# Patient Record
Sex: MALE | Race: WHITE | Employment: PART TIME | URBAN - METROPOLITAN AREA
[De-identification: names, ages, dates, MRNs, and addresses within clinical notes are randomized per-mention and may not be internally consistent; named-entity substitution may affect disease eponyms.]

---
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
## 2018-01-15 VITALS
DIASTOLIC BLOOD PRESSURE: 80 MMHG | WEIGHT: 299 LBS | HEART RATE: 74 BPM | HEIGHT: 71 IN | BODY MASS INDEX: 41.86 KG/M2 | SYSTOLIC BLOOD PRESSURE: 120 MMHG | RESPIRATION RATE: 20 BRPM

## 2018-01-15 NOTE — PROGRESS NOTES
Assessment   1  Never a smoker  2  Encounter for preventive health examination (V70 0) (Z00 00)  3  BMI 40 0-44 9, adult (V85 41) (Z68 41)    Plan   BMI 40 0-44 9, adult    · (1) CBC/PLT/DIFF; Status:Active; Requested DWW:[id number];    · (1) COMPREHENSIVE METABOLIC PANEL; Status:Active; Requested TZY:[id number];    · (1) HEMOGLOBIN A1C; Status:Active; Requested PDD:[id number];    · (1) INSULIN, FASTING; Status:Active; Requested ZSK:[id number];    · (1) LIPID PANEL, FASTING; Status:Active; Requested ED:[id number];    · (1) TSH; Status:Active; Requested QVN:00HMA5986;    · (1) URINALYSIS (will reflex a microscopy if leukocytes, occult blood, protein or nitrites  are not within normal limits); Status:Active; Requested NPE:[id number];    · (1) VITAMIN D 25-HYDROXY; Status:Active; Requested NGT:78QVK6985; Health Maintenance    · Always use a seat belt and shoulder strap when riding or driving a motor vehicle ;  Status:Complete;   Done: 68CHZ7033   · Decreasing the stress in your life may help your condition improve ; Status:Complete;    Done: 75TQG4675   · Eat a normal well-balanced diet ; Status:Complete;   Done: 97GAX1814   · Stretch and warm up your muscles during the first 10 minutes , then cool down your  muscles for the last 10 minutes of exercise ; Status:Complete;   Done: 27RLY7660   · There are many ways to reduce your risk of catching or spreading a sexually transmitted  Infection ; Status:Complete;   Done: 63GAT6336   · Use a sun block product with an SPF of 15 or more ; Status:Complete;   Done:  97BVY7650   · Using a latex condom can help prevent pregnancy   It can also help to prevent the spread  of sexually transmitted infections ; Status:Complete;   Done: 37UJU2201  PMH: Foreign body sensation in right ear canal    · Administer: Ciprodex 0 3-0 1 % Otic Suspension; INSTILL 4 DROPS IN THE AFFECTED  EAR(S) TWICE DAILY    Follow-up visit in 1 year Evaluation and Treatment  Follow-up  Status: Hold For - Scheduling  Requested for: 37WMG7555  Ordered; For: Health Maintenance;  Ordered By: Svitlana Cast  Performed:   Due: 50RMZ2916     Discussion/Summary  Impression: health maintenance visit  Currently, he eats a poor diet and has an inadequate exercise regimen  Prostate cancer screening: PSA is not indicated  Testicular cancer screening: testicular cancer screening is not indicated  Colorectal cancer screening: colorectal cancer screening is not indicated  Screening lab work includes glucose, lipid profile, 25-hydroxyvitamin D and urinalysis  The patient declines immunizations and EMMANUEL DECLINED ANNUAL FLU VACCINE  He was advised to be evaluated by an optometrist  Advice and education were given regarding nutrition, aerobic exercise, weight loss, reproductive health, cardiovascular risk reduction, self skin examination and TESTICULAR SELF EXAM  Patient discussion: discussed with the patient  VERY HIGH BMI- DISCUSSED HEALTH RISKS, STRATEGIES TO INCLUDE PHYSIAL ACTIVITY IN DAILY LIFE  DISCUSSED NEED TO REDUCE PORTION SIZES, MAKE HEALTHIER CHOICES  OFFERED REFERRAL TO NUTRITIONIST- DECLINED BY PATIENT  HE ALSO DECLINE FLU VACCINE  HE LIVES AT HOME AND COMMUTES DAILY TO COLLEGE  NEXT WELL CHECK IN 1 YEAR  The patient was counseled regarding instructions for management, risk factor reductions, patient and family education  Chief Complaint  22 yo patient is present for wellness exam      History of Present Illness  HPI: EMMANUEL IS HERE WITH HIS MOTHER FOR HIS ANNUAL WELL CHECK  MOM REQUESTS WE DO BLOOD WORK TO CHECK FOR "DIABETES"   HM, Adult Male: The patient is being seen for a health maintenance evaluation  The last health maintenance visit was 2 year(s) ago  Social History: Household members include mother and father  He is unmarried  Work status: STUDENT AT [employer]  DOING [employer]  The patient has never smoked cigarettes  He reports never drinking alcohol   He has never used illicit drugs  General Health: The patient's health since the last visit is described as good  He has regular dental visits  The patient brushes 1 time(s) a day, doesn't floss his teeth and reports his last dental visit was 2 months ago  Dental problems: no tooth pain and no caries  He complains of vision problems  Vision care includes wearing glasses and having eye examinations once a times per year  He denies hearing loss  Lifestyle:  He consumes a diverse and healthy diet  Dietary details include 2 servings of fruit per day, none servings of vegetables per day, 2 servings of meat per day, 1 servings of dairy foods per day and 2 cups of coffee per day  He has weight concerns  Weight control issues: overweight  He does not exercise regularly  He does not use tobacco  He denies alcohol use  He denies drug use  Reproductive health:  the patient is not sexually active  Screening: Prostate cancer screening includes no previous evaluation  Testicular cancer screening includes no previous evaluation  Colorectal cancer screening includes no previous screening  Metabolic screening includes no previous lipid profile, no previous glucose screening, no previous thyroid function test and no previous DEXA  Safety elements used: smoke detector and carbon monoxide detector  Risk findings: no tuberculosis exposure  Review of Systems    Constitutional: HE HAS GAINED 36 LBS IN THE LAST 2 YEARS, but no fever, not feeling poorly and not feeling tired  Eyes: no eyesight problems and no purulent discharge from the eyes  ENT: no nosebleeds and no hearing loss  Cardiovascular: no chest pain and no palpitations  Respiratory: no shortness of breath and no cough  Gastrointestinal: no constipation  Genitourinary: no dysuria  Musculoskeletal: no arthralgias and no myalgias  Integumentary: no rashes  Neurological: no headache  Psychiatric: no anxiety     Hematologic/Lymphatic: no tendency for easy bleeding, no tendency for easy bruising and no swollen glands in the neck  Past Medical History    · History of Blood type O+ (V49 89) (Z67 40)   · History of Foreign body sensation in right ear canal (380 89) (H61 891)   · History of Light-headedness (780 4) (R42)   · History of Normal birth weight   · History of Overweight (278 02) (E66 3)   · History of Vacuum extractor delivery, delivered (669 51) (O66 5)   · History of Wears eyeglasses (V49 89) (Z97 3)    Surgical History    · History of Elective Circumcision    Social History    · Brushes teeth daily   · Denied: History of Exposure to tobacco smoke   · Never a smoker   · No tobacco/smoke exposure   · Denied: History of Pets in the home   · Seeing a dentist   · Sleeps 6 -7 hours a day    Current Meds  1  Ciprodex 0 3-0 1 % Otic Suspension; INSTILL 4 DROPS IN THE AFFECTED EAR(S)   TWICE DAILY; To Be Done: 47HFJ8686; Status: HOLD FOR - Administration Ordered    Allergies   1  No Known Drug Allergies   2  No Known Environmental Allergies  3  No Known Food Allergies    Vitals   Recorded: 09TGO6111 02:47PM   Heart Rate 74, L Radial   Pulse Quality Normal, L Radial   Respiration Quality Normal   Respiration 20   Systolic 207, LUE, Sitting   Diastolic 80, LUE, Sitting   BP CUFF SIZE Extra-Large   Height 5 ft 11 25 in   Weight 299 lb    BMI Calculated 41 41   BSA Calculated 2 51   BMI Percentile 99 %   2-20 Stature Percentile 72 %   2-20 Weight Percentile 99 %     Physical Exam    Constitutional   General appearance: Abnormal   well developed, obese and well hydrated  Head and Face   Palpation of the face and sinuses: No sinus tenderness  Eyes   Conjunctiva and lids: No erythema, swelling or discharge  Pupils and irises: Equal, round, reactive to light  Ears, Nose, Mouth, and Throat   External inspection of ears and nose: Normal     Otoscopic examination: Tympanic membranes translucent with normal light reflex  Canals patent without erythema  Nasal mucosa, septum, and turbinates: Normal without edema or erythema  Lips, teeth, and gums: Normal, good dentition  Oropharynx: Normal with no erythema, edema, exudate or lesions  Neck   Neck: Supple, symmetric, trachea midline, no masses  Thyroid: Normal, no thyromegaly  Pulmonary   Respiratory effort: No increased work of breathing or signs of respiratory distress  Auscultation of lungs: Clear to auscultation  Cardiovascular   Auscultation of heart: Normal rate and rhythm, normal S1 and S2, no murmurs  Carotid pulses: 2+ bilaterally  Abdomen   Abdomen: Non-tender, no masses  Liver and spleen: No hepatomegaly or splenomegaly  Examination for hernias: No hernias appreciated  Genitourinary   Scrotal contents: Normal testes, no masses  Penis: Normal, no lesions  Lymphatic   Palpation of lymph nodes in neck: No lymphadenopathy  Palpation of lymph nodes in axillae: No lymphadenopathy  Palpation of lymph nodes in groin: No lymphadenopathy  Musculoskeletal   Gait and station: Normal     Inspection/palpation of digits and nails: Normal without clubbing or cyanosis  Inspection/palpation of joints, bones, and muscles: Normal     Range of motion: Normal     Stability: Normal     Muscle strength/tone: Normal     Skin   Skin and subcutaneous tissue: Normal without rashes or lesions  Palpation of skin and subcutaneous tissue: Normal turgor  Neurologic   Cranial nerves: Cranial nerves 2-12 intact  Reflexes: 2+ and symmetric  Sensation: No sensory loss  Psychiatric   Judgment and insight: Normal     Orientation to person, place and time: Normal     Recent and remote memory: Intact  Mood and affect: Normal        Results/Data  PHQ-9 Adult Depression Screening [id number] 02:53PM User, Migel     Test Name Result Flag Reference   PHQ-9 Adult Depression Score 1     Over the last two weeks, how often have you been bothered by any of the following problems?   Little interest or pleasure in doing things: Not at all - 0  Feeling down, depressed, or hopeless: Not at all - 0  Trouble falling or staying asleep, or sleeping too much: Not at all - 0  Feeling tired or having little energy: Several days - 1  Poor appetite or over eating: Not at all - 0  Feeling bad about yourself - or that you are a failure or have let yourself or your family down: Not at all - 0  Trouble concentrating on things, such as reading the newspaper or watching television: Not at all - 0  Moving or speaking so slowly that other people could have noticed   Or the opposite -  being so fidgety or restless that you have been moving around a lot more than usual: Not at all - 0  Thoughts that you would be better off dead, or of hurting yourself in some way: Not at all - 0   PHQ-9 Adult Depression Screening Negative     PHQ-9 Difficulty Level Not difficult at all     PHQ-9 Severity Minimal Depression         Signatures   Electronically signed by : Tracie Quinn MD; Oct 25 2017 10:20PM EST                       (Author)

## 2021-05-28 ENCOUNTER — TELEMEDICINE (OUTPATIENT)
Dept: FAMILY MEDICINE CLINIC | Facility: CLINIC | Age: 21
End: 2021-05-28
Payer: COMMERCIAL

## 2021-05-28 ENCOUNTER — TELEPHONE (OUTPATIENT)
Dept: FAMILY MEDICINE CLINIC | Facility: CLINIC | Age: 21
End: 2021-05-28

## 2021-05-28 VITALS — HEIGHT: 72 IN | WEIGHT: 310 LBS | BODY MASS INDEX: 41.99 KG/M2

## 2021-05-28 DIAGNOSIS — J02.9 ACUTE PHARYNGITIS, UNSPECIFIED ETIOLOGY: ICD-10-CM

## 2021-05-28 DIAGNOSIS — J02.9 ACUTE PHARYNGITIS, UNSPECIFIED ETIOLOGY: Primary | ICD-10-CM

## 2021-05-28 PROCEDURE — U0005 INFEC AGEN DETEC AMPLI PROBE: HCPCS | Performed by: FAMILY MEDICINE

## 2021-05-28 PROCEDURE — U0003 INFECTIOUS AGENT DETECTION BY NUCLEIC ACID (DNA OR RNA); SEVERE ACUTE RESPIRATORY SYNDROME CORONAVIRUS 2 (SARS-COV-2) (CORONAVIRUS DISEASE [COVID-19]), AMPLIFIED PROBE TECHNIQUE, MAKING USE OF HIGH THROUGHPUT TECHNOLOGIES AS DESCRIBED BY CMS-2020-01-R: HCPCS | Performed by: FAMILY MEDICINE

## 2021-05-28 PROCEDURE — 3008F BODY MASS INDEX DOCD: CPT | Performed by: FAMILY MEDICINE

## 2021-05-28 PROCEDURE — 1036F TOBACCO NON-USER: CPT | Performed by: FAMILY MEDICINE

## 2021-05-28 PROCEDURE — 99203 OFFICE O/P NEW LOW 30 MIN: CPT | Performed by: FAMILY MEDICINE

## 2021-05-28 PROCEDURE — 3725F SCREEN DEPRESSION PERFORMED: CPT | Performed by: FAMILY MEDICINE

## 2021-05-28 NOTE — PROGRESS NOTES
Virtual Regular Visit      Assessment/Plan:    Problem List Items Addressed This Visit        Digestive    Acute pharyngitis - Primary     Likely viral will check covid  If negative will need repeat in 3-4 days quarantine with family until second test neg         Relevant Orders    Novel Coronavirus (Covid-19),PCR [facility] - Collected at [place] [address] or [facility]    Novel Coronavirus (Covid-19),PCR [facility] - Collected at [place] [address] or [facility]               Reason for visit is  Sore throat headache  bodyaches 2 days  Chief Complaint   Patient presents with    Virtual Regular Visit    COVID-19        Encounter provider Kelly Rasheed MD    Provider located at [address] [place] [id number] [phone]      Recent Visits  No visits were found meeting these conditions  Showing recent visits within past 7 days and meeting all other requirements     Today's Visits  Date Type Provider Dept   05/28/21 Telephone [facility]   05/28/21 Telemedicine Kelly Rasheed MD  [address] today's visits and meeting all other requirements     Future Appointments  No visits were found meeting these conditions  Showing future appointments within next 150 days and meeting all other requirements        The patient was identified by name and date of birth  Nataliia Solitario was informed that this is a telemedicine visit and that the visit is being conducted through [address] [place] [address] and patient was informed that this is a secure, HIPAA-compliant platform  He agrees to proceed     My office door was closed  No one else was in the room  He acknowledged consent and understanding of privacy and security of the video platform  The patient has agreed to participate and understands they can discontinue the visit at any time  Patient is aware this is a billable service       Subjective  Nataliia Solitario is a 19 y o  male new pt with 2 days of illness works at [employer] not vaccinated no fever no chills no sob has sore throat headache and body aches    HPI     No past medical history on file  No past surgical history on file  No current outpatient medications on file  No current facility-administered medications for this visit  Not on File    Review of Systems   Constitutional: Negative for appetite change, chills, fatigue and fever  HENT: Positive for sore throat  Negative for congestion, rhinorrhea and sinus pain  Eyes: Negative for discharge  Respiratory: Negative for cough, shortness of breath and wheezing  Cardiovascular: Negative for chest pain and palpitations  Gastrointestinal: Negative for abdominal pain, diarrhea, nausea and vomiting  Musculoskeletal: Negative for myalgias  Neurological: Negative for headaches (on off mild)  Psychiatric/Behavioral: Negative for dysphoric mood  The patient is not nervous/anxious  Video Exam    There were no vitals filed for this visit  Physical Exam  Constitutional:       General: He is not in acute distress  Appearance: Normal appearance  He is well-developed  He is not ill-appearing  Eyes:      Extraocular Movements: Extraocular movements intact  Neck:      Musculoskeletal: Normal range of motion  Thyroid: No thyromegaly  Cardiovascular:      Rate and Rhythm: Normal rate  Pulmonary:      Effort: Pulmonary effort is normal  No respiratory distress  Breath sounds: Normal breath sounds  Neurological:      General: No focal deficit present  Mental Status: He is alert and oriented to person, place, and time  Mental status is at baseline  Psychiatric:         Mood and Affect: Mood normal          Behavior: Behavior normal           I spent 15 minutes directly with the patient during this visit      VIRTUAL VISIT DISCLAIMER    Primoabrahan Oliveira acknowledges that he has consented to an online visit or consultation   He understands that the online visit is based solely on information provided by him, and that, in the absence of a face-to-face physical evaluation by the physician, the diagnosis he receives is both limited and provisional in terms of accuracy and completeness  This is not intended to replace a full medical face-to-face evaluation by the physician  Zuhair Martinez understands and accepts these terms

## 2021-05-28 NOTE — ASSESSMENT & PLAN NOTE
Likely viral will check covid  If negative will need repeat in 3-4 days quarantine with family until second test neg

## 2021-05-29 LAB — SARS-COV-2 RNA RESP QL NAA+PROBE: NEGATIVE

## 2021-06-01 DIAGNOSIS — J02.9 ACUTE PHARYNGITIS, UNSPECIFIED ETIOLOGY: ICD-10-CM

## 2021-06-01 LAB — SARS-COV-2 RNA RESP QL NAA+PROBE: NEGATIVE

## 2021-06-01 PROCEDURE — U0005 INFEC AGEN DETEC AMPLI PROBE: HCPCS | Performed by: FAMILY MEDICINE

## 2021-06-01 PROCEDURE — U0003 INFECTIOUS AGENT DETECTION BY NUCLEIC ACID (DNA OR RNA); SEVERE ACUTE RESPIRATORY SYNDROME CORONAVIRUS 2 (SARS-COV-2) (CORONAVIRUS DISEASE [COVID-19]), AMPLIFIED PROBE TECHNIQUE, MAKING USE OF HIGH THROUGHPUT TECHNOLOGIES AS DESCRIBED BY CMS-2020-01-R: HCPCS | Performed by: FAMILY MEDICINE

## 2021-08-04 ENCOUNTER — HOSPITAL ENCOUNTER (OUTPATIENT)
Dept: RADIOLOGY | Facility: HOSPITAL | Age: 21
Discharge: HOME | End: 2021-08-04
Attending: FAMILY MEDICINE
Payer: COMMERCIAL

## 2021-08-04 ENCOUNTER — OFFICE VISIT (OUTPATIENT)
Dept: FAMILY MEDICINE CLINIC | Facility: CLINIC | Age: 21
End: 2021-08-04
Payer: COMMERCIAL

## 2021-08-04 VITALS
HEART RATE: 96 BPM | WEIGHT: 315 LBS | HEIGHT: 71 IN | TEMPERATURE: 97.2 F | SYSTOLIC BLOOD PRESSURE: 124 MMHG | OXYGEN SATURATION: 98 % | DIASTOLIC BLOOD PRESSURE: 86 MMHG | BODY MASS INDEX: 44.1 KG/M2

## 2021-08-04 DIAGNOSIS — M79.671 RIGHT FOOT PAIN: ICD-10-CM

## 2021-08-04 DIAGNOSIS — M79.671 RIGHT FOOT PAIN: Primary | ICD-10-CM

## 2021-08-04 PROCEDURE — 73630 X-RAY EXAM OF FOOT: CPT

## 2021-08-04 PROCEDURE — 1036F TOBACCO NON-USER: CPT | Performed by: FAMILY MEDICINE

## 2021-08-04 PROCEDURE — 3725F SCREEN DEPRESSION PERFORMED: CPT | Performed by: FAMILY MEDICINE

## 2021-08-04 PROCEDURE — 3008F BODY MASS INDEX DOCD: CPT | Performed by: FAMILY MEDICINE

## 2021-08-04 PROCEDURE — 99214 OFFICE O/P EST MOD 30 MIN: CPT | Performed by: FAMILY MEDICINE

## 2021-08-04 RX ORDER — TIZANIDINE 2 MG/1
2 TABLET ORAL EVERY 8 HOURS PRN
Qty: 20 TABLET | Refills: 0 | Status: SHIPPED | OUTPATIENT
Start: 2021-08-04

## 2021-08-04 RX ORDER — MELOXICAM 15 MG/1
15 TABLET ORAL DAILY
Qty: 20 TABLET | Refills: 0 | Status: SHIPPED | OUTPATIENT
Start: 2021-08-04

## 2021-08-04 NOTE — PROGRESS NOTES
Subjective:      Patient ID: Tonio Gamboa is a 19 y o  male  Speaks and understands fluent English, is here due to right sided foot pain, patient states that he does have right sided foot pain over the dorsum of the foot and also the plantar surface for 1 week, he was off for 2 days and went back to work on Monday and it was hurting again  He works for [employer] and has to wear steel toe shoes, states that while at work on [address] [place] [address], at the junction of the steel tip and shoe and it is painful, he was sent home from work and was told to not come back to work until cleared, states that the pain is 8/10 when he walks and 5/10 while sitting  He states that "work told him not to return with restrictions", for which I have told him to contact HR  History reviewed  No pertinent past medical history  Family History   Problem Relation Age of Onset    Hypertension Father     Diabetes type II Father     Hyperlipidemia Father     Heart disease Father     Hypertension Maternal Grandfather     Diabetes type II Maternal Grandfather     Hypertension Paternal Grandmother        Past Surgical History:   Procedure Laterality Date    WISDOM TOOTH EXTRACTION          reports that he has never smoked  He has never used smokeless tobacco  He reports previous alcohol use  He reports that he does not use drugs        Current Outpatient Medications:     meloxicam (MOBIC) 15 mg tablet, Take 1 tablet (15 mg total) by mouth daily With food, Disp: 20 tablet, Rfl: 0    tiZANidine (ZANAFLEX) 2 mg tablet, Take 1 tablet (2 mg total) by mouth every 8 (eight) hours as needed for muscle spasms Can make you sleepy, Disp: 20 tablet, Rfl: 0    The following portions of the patient's history were reviewed and updated as appropriate: allergies, current medications, past family history, past medical history, past social history, past surgical history and problem list     Review of Systems Constitutional: Negative for chills and fever  HENT: Negative for congestion, rhinorrhea and sore throat  Eyes: Negative for discharge, redness and itching  Respiratory: Negative for chest tightness, shortness of breath and wheezing  Cardiovascular: Negative for chest pain and palpitations  Gastrointestinal: Negative for abdominal pain, constipation and diarrhea  Genitourinary: Negative for dysuria  Musculoskeletal: Positive for arthralgias  Negative for joint swelling  Skin: Negative for pallor and rash  Neurological: Negative for dizziness, weakness, numbness and headaches  PHQ-9 Depression Screening    PHQ-9:   Frequency of the following problems over the past two weeks:      Little interest or pleasure in doing things: 0 - not at all  Feeling down, depressed, or hopeless: 0 - not at all  PHQ-2 Score: 0           Objective:    /86 (BP Location: Left arm, Patient Position: Sitting, Cuff Size: Standard)   Pulse 96   Temp (!) 97 2 °F (36 2 °C) (Temporal)   Ht 5' 11 25" (1 81 m)   Wt (!) 148 kg (327 lb)   SpO2 98%   BMI 45 29 kg/m²      Physical Exam  Vitals and nursing note reviewed  Constitutional:       Appearance: Normal appearance  He is well-developed  HENT:      Head: Normocephalic and atraumatic  Right Ear: External ear normal       Left Ear: External ear normal       Nose: Nose normal    Eyes:      Conjunctiva/sclera: Conjunctivae normal       Pupils: Pupils are equal, round, and reactive to light  Cardiovascular:      Rate and Rhythm: Normal rate and regular rhythm  Heart sounds: Normal heart sounds  No murmur heard  No gallop  Pulmonary:      Effort: Pulmonary effort is normal  No respiratory distress  Breath sounds: Normal breath sounds  No wheezing or rales  Abdominal:      General: There is no distension  Palpations: Abdomen is soft  Tenderness: There is no abdominal tenderness     Musculoskeletal:         General: Tenderness and deformity present  Cervical back: Normal range of motion and neck supple  Right lower leg: No edema  Left lower leg: No edema  Right foot: Normal range of motion  Swelling and tenderness present  No deformity, bunion, bony tenderness or crepitus  Left foot: Normal range of motion  No swelling, deformity, tenderness, bony tenderness or crepitus  Comments: Mild soft tissue swelling over the left dorsal foot along the 2ND AND THRID mtp JOINTS   Lymphadenopathy:      Cervical: No cervical adenopathy  Skin:     Coloration: Skin is not pale  Findings: No erythema or rash  Neurological:      General: No focal deficit present  Mental Status: He is alert  Mental status is at baseline  Psychiatric:         Mood and Affect: Mood normal          Behavior: Behavior normal              Assessment/Plan:         Diagnoses and all orders for this visit:    Right foot pain  -     XR foot 3+ vw right; Future  -     Ambulatory referral to Podiatry; Future  -     meloxicam (MOBIC) 15 mg tablet; Take 1 tablet (15 mg total) by mouth daily With food  -     tiZANidine (ZANAFLEX) 2 mg tablet; Take 1 tablet (2 mg total) by mouth every 8 (eight) hours as needed for muscle spasms Can make you sleepy      I have advised the patient to get the xray today, he can do foot massage with ice bottle rolling technique  I have prescribed him Meloxicam for pain relief, he can use topical NSAIDs or analgesic as well  He can use tizanidine for pain relief with tylenol prn  Discussed tizanidine can may him sedated and he should not drive or operate any heavy machinery under the influence of this medication, he  Verbalized understanding  Read package inserts for all medications before starting a new medications, call me if you have any questions  I have given out of work excuse for today and tomorrow    He would like to stay out of work for Friday since he thinks that he went to work too early and this caused the problem  Have advised him to call the office on Friday and week may extended for another day if he is absolutely not able to go back to work  It would be appropriate to send him back to work with restrictions  However the patient does not want to do that  I have recommended him to get a functional capacity evaluation for any further work excuse  I have also referred him to Podiatry since this problem happened prior to the injury with the box  This may be related to footwear  I have advised him to wear appropriate fitting footwear  Patient was given opportunity to ask questions and all questions were answered  Portions of the record may have been created with voice recognition software  Occasional wrong word or "sound a like" substitutions may have occurred due to the inherent limitations of voice recognition software  Read the chart carefully and recognize, using context, where substitutions have occurred

## 2021-08-04 NOTE — LETTER
August 4, 2021     Patient: Andres Torres   YOB: 2000   Date of Visit: 8/4/2021       To Whom it May Concern: Andres Torres is under my professional care  He was seen in my office on 8/4/2021  He may return to work on 8/6/2021  If you have any questions or concerns, please don't hesitate to call           Sincerely,          Khushboo Rosales MD        CC: No Recipients

## 2021-08-10 PROBLEM — S92.341A CLOSED DISPLACED FRACTURE OF FOURTH METATARSAL BONE OF RIGHT FOOT: Status: ACTIVE | Noted: 2021-08-10

## 2021-08-30 ENCOUNTER — HOSPITAL ENCOUNTER (OUTPATIENT)
Dept: RADIOLOGY | Facility: HOSPITAL | Age: 21
Discharge: HOME | End: 2021-08-30
Payer: COMMERCIAL

## 2021-08-30 DIAGNOSIS — S92.341D CLOSED DISPLACED FRACTURE OF FOURTH METATARSAL BONE OF RIGHT FOOT WITH ROUTINE HEALING, SUBSEQUENT ENCOUNTER: ICD-10-CM

## 2021-08-30 PROCEDURE — 73630 X-RAY EXAM OF FOOT: CPT

## 2022-01-03 ENCOUNTER — TELEPHONE (OUTPATIENT)
Dept: FAMILY MEDICINE CLINIC | Facility: CLINIC | Age: 22
End: 2022-01-03

## 2022-01-03 DIAGNOSIS — Z20.828 EXPOSURE TO SARS-ASSOCIATED CORONAVIRUS: Primary | ICD-10-CM

## 2022-01-03 PROCEDURE — U0003 INFECTIOUS AGENT DETECTION BY NUCLEIC ACID (DNA OR RNA); SEVERE ACUTE RESPIRATORY SYNDROME CORONAVIRUS 2 (SARS-COV-2) (CORONAVIRUS DISEASE [COVID-19]), AMPLIFIED PROBE TECHNIQUE, MAKING USE OF HIGH THROUGHPUT TECHNOLOGIES AS DESCRIBED BY CMS-2020-01-R: HCPCS | Performed by: FAMILY MEDICINE

## 2022-01-03 PROCEDURE — U0005 INFEC AGEN DETEC AMPLI PROBE: HCPCS | Performed by: FAMILY MEDICINE

## 2022-01-03 NOTE — TELEPHONE ENCOUNTER
Sick since last week, headaches, muscle cramps, sore throat, no fever, runny nose, coughing, people at work had covid, was he was not around them  He will need a doctors note for today  Should he be tested for covid  Will go to [address] [place] [address] to be tested      Patient  # [phone]